# Patient Record
Sex: FEMALE | Race: WHITE | Employment: FULL TIME | URBAN - NONMETROPOLITAN AREA
[De-identification: names, ages, dates, MRNs, and addresses within clinical notes are randomized per-mention and may not be internally consistent; named-entity substitution may affect disease eponyms.]

---

## 2018-05-24 ENCOUNTER — NURSE TRIAGE (OUTPATIENT)
Dept: ADMINISTRATIVE | Age: 33
End: 2018-05-24

## 2018-05-25 ENCOUNTER — NURSE TRIAGE (OUTPATIENT)
Dept: ADMINISTRATIVE | Age: 33
End: 2018-05-25

## 2018-07-09 ENCOUNTER — HOSPITAL ENCOUNTER (INPATIENT)
Age: 33
LOS: 3 days | Discharge: HOME | End: 2018-07-12
Attending: OBSTETRICS & GYNECOLOGY | Admitting: OBSTETRICS & GYNECOLOGY
Payer: COMMERCIAL

## 2018-07-09 ENCOUNTER — APPOINTMENT (OUTPATIENT)
Dept: LABOR AND DELIVERY | Age: 33
End: 2018-07-09
Payer: COMMERCIAL

## 2018-07-09 LAB
ABO: NORMAL
AMPHETAMINE+METHAMPHETAMINE URINE SCREEN: NEGATIVE
ANTIBODY SCREEN: NORMAL
BARBITURATE QUANTITATIVE URINE: NEGATIVE
BASOPHILS # BLD: 0.3 %
BASOPHILS ABSOLUTE: 0 THOU/MM3 (ref 0–0.1)
BENZODIAZEPINE QUANTITATIVE URINE: NEGATIVE
CANNABINOID QUANTITATIVE URINE: NEGATIVE
COCAINE METABOLITE QUANTITATIVE URINE: NEGATIVE
EOSINOPHIL # BLD: 0.9 %
EOSINOPHILS ABSOLUTE: 0.1 THOU/MM3 (ref 0–0.4)
ERYTHROCYTE [DISTWIDTH] IN BLOOD BY AUTOMATED COUNT: 12.3 % (ref 11.5–14.5)
ERYTHROCYTE [DISTWIDTH] IN BLOOD BY AUTOMATED COUNT: 42.2 FL (ref 35–45)
HCT VFR BLD CALC: 36.3 % (ref 37–47)
HEMOGLOBIN: 12.4 GM/DL (ref 12–16)
IMMATURE GRANS (ABS): 0.1 THOU/MM3 (ref 0–0.07)
IMMATURE GRANULOCYTES: 0.8 %
LYMPHOCYTES # BLD: 13.2 %
LYMPHOCYTES ABSOLUTE: 1.6 THOU/MM3 (ref 1–4.8)
MCH RBC QN AUTO: 31.9 PG (ref 26–33)
MCHC RBC AUTO-ENTMCNC: 34.2 GM/DL (ref 32.2–35.5)
MCV RBC AUTO: 93.3 FL (ref 81–99)
MONOCYTES # BLD: 9.4 %
MONOCYTES ABSOLUTE: 1.1 THOU/MM3 (ref 0.4–1.3)
NUCLEATED RED BLOOD CELLS: 0 /100 WBC
OPIATES, URINE: NEGATIVE
OXYCODONE: NEGATIVE
PHENCYCLIDINE QUANTITATIVE URINE: NEGATIVE
PLATELET # BLD: 212 THOU/MM3 (ref 130–400)
PMV BLD AUTO: 10.3 FL (ref 9.4–12.4)
RBC # BLD: 3.89 MILL/MM3 (ref 4.2–5.4)
RH FACTOR: NORMAL
SEG NEUTROPHILS: 75.4 %
SEGMENTED NEUTROPHILS ABSOLUTE COUNT: 9 THOU/MM3 (ref 1.8–7.7)
WBC # BLD: 12 THOU/MM3 (ref 4.8–10.8)

## 2018-07-09 PROCEDURE — 6360000002 HC RX W HCPCS

## 2018-07-09 PROCEDURE — 86900 BLOOD TYPING SEROLOGIC ABO: CPT

## 2018-07-09 PROCEDURE — 86850 RBC ANTIBODY SCREEN: CPT

## 2018-07-09 PROCEDURE — 80307 DRUG TEST PRSMV CHEM ANLYZR: CPT

## 2018-07-09 PROCEDURE — 86592 SYPHILIS TEST NON-TREP QUAL: CPT

## 2018-07-09 PROCEDURE — 6370000000 HC RX 637 (ALT 250 FOR IP): Performed by: OBSTETRICS & GYNECOLOGY

## 2018-07-09 PROCEDURE — 2580000003 HC RX 258: Performed by: OBSTETRICS & GYNECOLOGY

## 2018-07-09 PROCEDURE — 86901 BLOOD TYPING SEROLOGIC RH(D): CPT

## 2018-07-09 PROCEDURE — 1220000001 HC SEMI PRIVATE L&D R&B

## 2018-07-09 PROCEDURE — 85025 COMPLETE CBC W/AUTO DIFF WBC: CPT

## 2018-07-09 PROCEDURE — 36415 COLL VENOUS BLD VENIPUNCTURE: CPT

## 2018-07-09 RX ORDER — METHYLERGONOVINE MALEATE 0.2 MG/ML
200 INJECTION INTRAVENOUS PRN
Status: DISCONTINUED | OUTPATIENT
Start: 2018-07-09 | End: 2018-07-10 | Stop reason: HOSPADM

## 2018-07-09 RX ORDER — DIPHENHYDRAMINE HYDROCHLORIDE 50 MG/ML
25 INJECTION INTRAMUSCULAR; INTRAVENOUS EVERY 4 HOURS PRN
Status: DISCONTINUED | OUTPATIENT
Start: 2018-07-09 | End: 2018-07-10 | Stop reason: HOSPADM

## 2018-07-09 RX ORDER — ZOLPIDEM TARTRATE 10 MG/1
10 TABLET ORAL NIGHTLY PRN
Status: DISCONTINUED | OUTPATIENT
Start: 2018-07-09 | End: 2018-07-10

## 2018-07-09 RX ORDER — ACETAMINOPHEN 325 MG/1
650 TABLET ORAL EVERY 4 HOURS PRN
Status: DISCONTINUED | OUTPATIENT
Start: 2018-07-09 | End: 2018-07-10 | Stop reason: HOSPADM

## 2018-07-09 RX ORDER — ONDANSETRON 2 MG/ML
8 INJECTION INTRAMUSCULAR; INTRAVENOUS EVERY 6 HOURS PRN
Status: DISCONTINUED | OUTPATIENT
Start: 2018-07-09 | End: 2018-07-10 | Stop reason: HOSPADM

## 2018-07-09 RX ORDER — TERBUTALINE SULFATE 1 MG/ML
0.25 INJECTION, SOLUTION SUBCUTANEOUS ONCE
Status: DISCONTINUED | OUTPATIENT
Start: 2018-07-09 | End: 2018-07-10 | Stop reason: HOSPADM

## 2018-07-09 RX ORDER — LIDOCAINE HYDROCHLORIDE 10 MG/ML
30 INJECTION, SOLUTION EPIDURAL; INFILTRATION; INTRACAUDAL; PERINEURAL PRN
Status: DISCONTINUED | OUTPATIENT
Start: 2018-07-09 | End: 2018-07-10 | Stop reason: HOSPADM

## 2018-07-09 RX ORDER — IBUPROFEN 800 MG/1
800 TABLET ORAL EVERY 8 HOURS PRN
Status: DISCONTINUED | OUTPATIENT
Start: 2018-07-09 | End: 2018-07-10

## 2018-07-09 RX ORDER — CARBOPROST TROMETHAMINE 250 UG/ML
250 INJECTION, SOLUTION INTRAMUSCULAR PRN
Status: CANCELLED | OUTPATIENT
Start: 2018-07-09

## 2018-07-09 RX ORDER — MORPHINE SULFATE 2 MG/ML
2 INJECTION, SOLUTION INTRAMUSCULAR; INTRAVENOUS
Status: DISCONTINUED | OUTPATIENT
Start: 2018-07-09 | End: 2018-07-10

## 2018-07-09 RX ORDER — SODIUM CHLORIDE, SODIUM LACTATE, POTASSIUM CHLORIDE, CALCIUM CHLORIDE 600; 310; 30; 20 MG/100ML; MG/100ML; MG/100ML; MG/100ML
INJECTION, SOLUTION INTRAVENOUS CONTINUOUS
Status: DISCONTINUED | OUTPATIENT
Start: 2018-07-09 | End: 2018-07-10

## 2018-07-09 RX ORDER — BUTORPHANOL TARTRATE 1 MG/ML
1 INJECTION, SOLUTION INTRAMUSCULAR; INTRAVENOUS
Status: DISCONTINUED | OUTPATIENT
Start: 2018-07-09 | End: 2018-07-10 | Stop reason: HOSPADM

## 2018-07-09 RX ORDER — MORPHINE SULFATE 4 MG/ML
4 INJECTION, SOLUTION INTRAMUSCULAR; INTRAVENOUS
Status: DISCONTINUED | OUTPATIENT
Start: 2018-07-09 | End: 2018-07-10

## 2018-07-09 RX ADMIN — SODIUM CHLORIDE, POTASSIUM CHLORIDE, SODIUM LACTATE AND CALCIUM CHLORIDE: 600; 310; 30; 20 INJECTION, SOLUTION INTRAVENOUS at 18:10

## 2018-07-09 RX ADMIN — Medication 25 MCG: at 20:05

## 2018-07-10 ENCOUNTER — ANESTHESIA EVENT (OUTPATIENT)
Dept: LABOR AND DELIVERY | Age: 33
End: 2018-07-10
Payer: COMMERCIAL

## 2018-07-10 ENCOUNTER — ANESTHESIA (OUTPATIENT)
Dept: LABOR AND DELIVERY | Age: 33
End: 2018-07-10
Payer: COMMERCIAL

## 2018-07-10 LAB — RPR: NONREACTIVE

## 2018-07-10 PROCEDURE — 3E0P7VZ INTRODUCTION OF HORMONE INTO FEMALE REPRODUCTIVE, VIA NATURAL OR ARTIFICIAL OPENING: ICD-10-PCS | Performed by: OBSTETRICS & GYNECOLOGY

## 2018-07-10 PROCEDURE — 6370000000 HC RX 637 (ALT 250 FOR IP): Performed by: OBSTETRICS & GYNECOLOGY

## 2018-07-10 PROCEDURE — 7200000001 HC VAGINAL DELIVERY

## 2018-07-10 PROCEDURE — 2500000003 HC RX 250 WO HCPCS: Performed by: ANESTHESIOLOGY

## 2018-07-10 PROCEDURE — 6360000002 HC RX W HCPCS: Performed by: OBSTETRICS & GYNECOLOGY

## 2018-07-10 PROCEDURE — 3700000025 ANESTHESIA EPIDURAL BLOCK: Performed by: ANESTHESIOLOGY

## 2018-07-10 PROCEDURE — 1220000000 HC SEMI PRIVATE OB R&B

## 2018-07-10 PROCEDURE — 0KQM0ZZ REPAIR PERINEUM MUSCLE, OPEN APPROACH: ICD-10-PCS | Performed by: OBSTETRICS & GYNECOLOGY

## 2018-07-10 PROCEDURE — 2580000003 HC RX 258: Performed by: OBSTETRICS & GYNECOLOGY

## 2018-07-10 RX ORDER — SODIUM CHLORIDE 0.9 % (FLUSH) 0.9 %
10 SYRINGE (ML) INJECTION EVERY 12 HOURS SCHEDULED
Status: DISCONTINUED | OUTPATIENT
Start: 2018-07-10 | End: 2018-07-11

## 2018-07-10 RX ORDER — LANOLIN 100 %
OINTMENT (GRAM) TOPICAL PRN
Status: DISCONTINUED | OUTPATIENT
Start: 2018-07-10 | End: 2018-07-12 | Stop reason: HOSPADM

## 2018-07-10 RX ORDER — ONDANSETRON 2 MG/ML
4 INJECTION INTRAMUSCULAR; INTRAVENOUS EVERY 6 HOURS PRN
Status: DISCONTINUED | OUTPATIENT
Start: 2018-07-10 | End: 2018-07-10 | Stop reason: HOSPADM

## 2018-07-10 RX ORDER — HYDROCODONE BITARTRATE AND ACETAMINOPHEN 5; 325 MG/1; MG/1
2 TABLET ORAL EVERY 4 HOURS PRN
Status: DISCONTINUED | OUTPATIENT
Start: 2018-07-10 | End: 2018-07-12 | Stop reason: HOSPADM

## 2018-07-10 RX ORDER — SODIUM CHLORIDE 0.9 % (FLUSH) 0.9 %
10 SYRINGE (ML) INJECTION PRN
Status: DISCONTINUED | OUTPATIENT
Start: 2018-07-10 | End: 2018-07-11

## 2018-07-10 RX ORDER — NALOXONE HYDROCHLORIDE 0.4 MG/ML
0.4 INJECTION, SOLUTION INTRAMUSCULAR; INTRAVENOUS; SUBCUTANEOUS PRN
Status: DISCONTINUED | OUTPATIENT
Start: 2018-07-10 | End: 2018-07-10 | Stop reason: HOSPADM

## 2018-07-10 RX ORDER — DOCUSATE SODIUM 100 MG/1
100 CAPSULE, LIQUID FILLED ORAL 2 TIMES DAILY
Status: DISCONTINUED | OUTPATIENT
Start: 2018-07-10 | End: 2018-07-12 | Stop reason: HOSPADM

## 2018-07-10 RX ORDER — ACETAMINOPHEN 325 MG/1
650 TABLET ORAL EVERY 4 HOURS PRN
Status: DISCONTINUED | OUTPATIENT
Start: 2018-07-10 | End: 2018-07-12 | Stop reason: HOSPADM

## 2018-07-10 RX ORDER — METHYLERGONOVINE MALEATE 0.2 MG/ML
200 INJECTION INTRAVENOUS
Status: ACTIVE | OUTPATIENT
Start: 2018-07-10 | End: 2018-07-10

## 2018-07-10 RX ORDER — IBUPROFEN 800 MG/1
800 TABLET ORAL EVERY 8 HOURS PRN
Status: DISCONTINUED | OUTPATIENT
Start: 2018-07-10 | End: 2018-07-12 | Stop reason: HOSPADM

## 2018-07-10 RX ORDER — NALBUPHINE HCL 10 MG/ML
5 AMPUL (ML) INJECTION EVERY 4 HOURS PRN
Status: DISCONTINUED | OUTPATIENT
Start: 2018-07-10 | End: 2018-07-10 | Stop reason: HOSPADM

## 2018-07-10 RX ORDER — SODIUM CHLORIDE, SODIUM LACTATE, POTASSIUM CHLORIDE, CALCIUM CHLORIDE 600; 310; 30; 20 MG/100ML; MG/100ML; MG/100ML; MG/100ML
INJECTION, SOLUTION INTRAVENOUS CONTINUOUS
Status: DISCONTINUED | OUTPATIENT
Start: 2018-07-10 | End: 2018-07-12 | Stop reason: HOSPADM

## 2018-07-10 RX ORDER — ONDANSETRON 4 MG/1
8 TABLET, FILM COATED ORAL EVERY 8 HOURS PRN
Status: DISCONTINUED | OUTPATIENT
Start: 2018-07-10 | End: 2018-07-12 | Stop reason: HOSPADM

## 2018-07-10 RX ORDER — HYDROCODONE BITARTRATE AND ACETAMINOPHEN 5; 325 MG/1; MG/1
1 TABLET ORAL EVERY 4 HOURS PRN
Status: DISCONTINUED | OUTPATIENT
Start: 2018-07-10 | End: 2018-07-12 | Stop reason: HOSPADM

## 2018-07-10 RX ADMIN — SODIUM CHLORIDE, POTASSIUM CHLORIDE, SODIUM LACTATE AND CALCIUM CHLORIDE: 600; 310; 30; 20 INJECTION, SOLUTION INTRAVENOUS at 17:04

## 2018-07-10 RX ADMIN — ONDANSETRON 8 MG: 2 INJECTION INTRAMUSCULAR; INTRAVENOUS at 15:28

## 2018-07-10 RX ADMIN — Medication 16 ML/HR: at 09:30

## 2018-07-10 RX ADMIN — SODIUM CHLORIDE, POTASSIUM CHLORIDE, SODIUM LACTATE AND CALCIUM CHLORIDE: 600; 310; 30; 20 INJECTION, SOLUTION INTRAVENOUS at 08:19

## 2018-07-10 RX ADMIN — SODIUM CHLORIDE, POTASSIUM CHLORIDE, SODIUM LACTATE AND CALCIUM CHLORIDE: 600; 310; 30; 20 INJECTION, SOLUTION INTRAVENOUS at 01:50

## 2018-07-10 RX ADMIN — Medication 25 MCG: at 00:04

## 2018-07-10 RX ADMIN — IBUPROFEN 800 MG: 800 TABLET ORAL at 17:35

## 2018-07-10 RX ADMIN — SODIUM CHLORIDE, POTASSIUM CHLORIDE, SODIUM LACTATE AND CALCIUM CHLORIDE: 600; 310; 30; 20 INJECTION, SOLUTION INTRAVENOUS at 11:46

## 2018-07-10 RX ADMIN — Medication 1 MILLI-UNITS/MIN: at 04:28

## 2018-07-10 ASSESSMENT — PAIN SCALES - GENERAL: PAINLEVEL_OUTOF10: 1

## 2018-07-10 NOTE — L&D DELIVERY NOTE
Respiratory Therapist (Night)      Cord    Vessels:  3 Vessels  Complications:  None  Delayed Cord Clamping?:  Yes  Cord Clamped Date/Time:  7/10/2018 1703  Cord Blood Disposition:  Lab  Gases Sent?:  No  Stem Cell Collection (by provider): No     Placenta    Date/time:  7/10/2018 1715  Removal:  Spontaneous  Appearance:  Intact  Disposition:  Refrigerator     Delivery Resuscitation    Method:  Bulb Suction, Stimulation     Apgars    Living status:  Living  Apgars   1 Minute:   5 Minute:   10 Minute 15 Minute 20 Minute   Skin Color: 0  1       Heart Rate: 2  2       Reflex Irritability: 1  2       Muscle Tone: 2  2       Respiratory Effort: 2  2       Total: 7  9               Apgars Assigned By:  JUDITH CASTILLO     Skin to Skin    Skin to skin initiation date/time: 7/10/18 1701   Skin to skin with:   Mother  Skin to skin end date/time:        Delivery Information    Episiotomy:  None  Perineal lacerations:  2nd Repaired:  Yes   Vaginal laceration:  No    Cervical laceration:  No    Surgical or additional est. blood loss (mL):  0 (View Only):  Edit in Flowsheets   Combined est. blood loss (mL):  0     Vaginal Delivery Counts    Initial count personnel:  16 INSTRUMENTS,1 NEEDLE,AND 20 SPONGES  Initial count verified by:  LEAH RIVER RN   4x4:   Needles:   Instruments:   Lap Pads:   Sponges:     Initial counts:          Final counts:             Other Procedures    Procedures:  None     Labor Length    1st stage:  6h 32m  2nd stage:  1h 26m  3rd stage:  0h 17m

## 2018-07-10 NOTE — ANESTHESIA PRE PROCEDURE
Department of Anesthesiology  Preprocedure Note       Name:  Zachariah Titus   Age:  28 y.o.  :  1985                                          MRN:  [id number]         Date:  7/10/2018      Surgeon: * No surgeons listed *    Procedure: * No procedures listed *    Medications prior to admission:   Prior to Admission medications    Not on File       Current medications:    Current Facility-Administered Medications   Medication Dose Route Frequency Provider Last Rate Last Dose    lactated ringers infusion   Intravenous Continuous Leigh Ann Blank,  mL/hr at 07/10/18 0819      lidocaine PF 1 % injection 30 mL  30 mL Other PRN Leigh Ann Blank, DO        butorphanol (STADOL) injection 1 mg  1 mg Intravenous Q1H PRN Leigh Ann Blank, DO        acetaminophen (TYLENOL) tablet 650 mg  650 mg Oral Q4H PRN Leigh Ann Blank, DO        ibuprofen (ADVIL;MOTRIN) tablet 800 mg  800 mg Oral Q8H PRN Leigh Ann Blank, DO        morphine (PF) injection 2 mg  2 mg Intravenous Q2H PRN Leigh Ann Blank, DO        Or    morphine (PF) injection 4 mg  4 mg Intravenous Q2H PRN Leigh Ann Blank, DO        oxytocin (PITOCIN) 30 units in 500 mL infusion  1 elizabeth-units/min Intravenous Continuous Leigh Ann Blank, DO 6 mL/hr at 07/10/18 0836 6 elizabeth-units/min at 07/10/18 0836    diphenhydrAMINE (BENADRYL) injection 25 mg  25 mg Intravenous Q4H PRN Leigh Ann Blank, DO        ondansetron [facility]) injection 8 mg  8 mg Intravenous Q6H PRN Leigh Ann Blank, DO        terbutaline (BRETHINE) injection 0.25 mg  0.25 mg Subcutaneous Once Leigh Ann Blank, DO        oxytocin (PITOCIN) 30 units in 500 mL infusion  1 elizabeth-units/min Intravenous Continuous PRN Leigh Ann Blank, DO        methylergonovine (METHERGINE) injection 200 mcg  200 mcg Intramuscular PRN Leigh Ann Blank, DO        witch hazel-glycerin (TUCKS) pad   Topical PRN Leigh Ann Blank, DO        benzocaine-menthol (DERMOPLAST) 20-0.5 % spray   Topical HYACINTH yDe DO        zolpidem [facility] [employer] [facility]) tablet 10 mg  10 mg Oral Nightly PRN Earl Dye DO           Allergies:  No Known Allergies    Problem List:  There is no problem list on file for this patient. Past Medical History:  History reviewed. No pertinent past medical history. Past Surgical History:  History reviewed. No pertinent surgical history. Social History:    Social History   Substance Use Topics    Smoking status: Never Smoker    Smokeless tobacco: Never Used    Alcohol use No                                Counseling given: Not Answered      Vital Signs (Current):   Vitals:    07/10/18 0710 07/10/18 0715 07/10/18 0745 07/10/18 0815   BP:  (!) 146/87 (!) 151/93 136/61   Pulse:  86 81 74   Resp:  16  16   Temp: 98.6 °F (37 °C)      TempSrc: Oral      SpO2:       Weight:       Height:                                                  BP Readings from Last 3 Encounters:   07/10/18 136/61       NPO Status: Time of last liquid consumption: 1645                        Time of last solid consumption: 1645                        Date of last liquid consumption: 07/09/18                        Date of last solid food consumption: 07/09/18    BMI:   Wt Readings from Last 3 Encounters:   07/09/18 237 lb (107.5 kg)     Body mass index is 38.25 kg/m². CBC:   Lab Results   Component Value Date    WBC 12.0 07/09/2018    RBC 3.89 07/09/2018    HGB 12.4 07/09/2018    HCT 36.3 07/09/2018    MCV 93.3 07/09/2018     07/09/2018       CMP: No results found for: NA, K, CL, CO2, BUN, CREATININE, GFRAA, AGRATIO, LABGLOM, GLUCOSE, PROT, CALCIUM, BILITOT, ALKPHOS, AST, ALT    POC Tests: No results for input(s): POCGLU, POCNA, POCK, POCCL, POCBUN, POCHEMO, POCHCT in the last 72 hours.     Coags: No results found for: PROTIME, INR, APTT    HCG (If Applicable): No results found for: PREGTESTUR, PREGSERUM, HCG, HCGQUANT     ABGs: No results found for: PHART, PO2ART, LNY0YGK, CJW2KPE, BEART,

## 2018-07-10 NOTE — PLAN OF CARE
Problem: Anxiety:  Goal: Level of anxiety will decrease  Level of anxiety will decrease   Outcome: Ongoing  anxiety levels will remain decreased throughout labor with deep breathing techniques and imagery as evidence by her remaining calm, cooperative and focused throughout labor. Problem: Breathing Pattern - Ineffective:  Goal: Able to breathe comfortably  Able to breathe comfortably   Outcome: Ongoing  will remain able to breathe comfortably throughout labor as evidence by no shortness of breath. Problem: Fluid Volume - Imbalance:  Goal: Absence of intrapartum hemorrhage signs and symptoms  Absence of intrapartum hemorrhage signs and symptoms   Outcome: Ongoing  will remain absent of intrapartum hemorrhage as evidence by an absence of signs and symptoms of abnormal bleeding. Problem: Infection - Intrapartum Infection:  Goal: Will show no infection signs and symptoms  Will show no infection signs and symptoms   Outcome: Ongoing  will remain without infection as evidence by showing no signs and symptoms of fever throughout labor. Problem: Labor Process - Prolonged:  Goal: Uterine contractions within specified parameters  Uterine contractions within specified parameters   Outcome: Ongoing  will continue to have adequate uterine contractions throughout labor as evidence by RN palpating the uterus muscle tightening and relaxing. Problem: Pain - Acute:  Goal: Able to cope with pain  Able to cope with pain   Outcome: Ongoing  will be able to cope appropriately with pain throughout labor as evidence by  remaining calm and cooperative.   Pain goal 4/10    Problem: Tissue Perfusion - Uteroplacental, Altered:  Goal: Absence of abnormal fetal heart rate pattern  Absence of abnormal fetal heart rate pattern   Outcome: Ongoing  will remain absent of abnormal fetal heart rate patterns by cooperating with RN's uteroplacental recessitation when necessary throughout labor as evidence by a reactive or

## 2018-07-10 NOTE — ANESTHESIA PROCEDURE NOTES
skin.  Aspiration for CSF/Blood: Negative    Paresthesia: Negative    Test dose: Negative (3ml 1. 5%Lidocaine with 1:200,000 Epinephrine)    Difficulties/Complications: None    Epidural Medication:  Bolus Dose:   Premixed Syringe: Ropivacaine 0.2% 0ml given. Injection was made incrementally with constant monitoring and aspiration. Infusion Dose:  Premixed Bag: Ropivacaine 0.1% with Fentanyl 3 mcg/ml started at 16 ml/hr. Catheter bolused with 15 mL from premixed bag.     Willam Ramirez MD (ATTENDING ANESTHESIOLOGIST: Imm)    10:05 AM

## 2018-07-10 NOTE — FLOWSHEET NOTE
Dr. Natasha Casanova returned page. Updated on patient status. EFM reactive, aubrey every 2-6 minutes irregularly, VE 1-2/70/-2. Updated that cytotec is due.  MD stated to not give cytotec, to start pitocin and MD will be in to break water this AM.

## 2018-07-10 NOTE — PLAN OF CARE
Problem: Anxiety:  Goal: Level of anxiety will decrease  Level of anxiety will decrease   Outcome: Ongoing  Pt remains calm about the birthing experience, pt's mother at bedside, supportive. All questions/concerns addressed by RN. Problem: Breathing Pattern - Ineffective:  Goal: Able to breathe comfortably  Able to breathe comfortably   Outcome: Ongoing  No signs of resp distress noted. Sp02 remains greater than 92% on room air. Respirations equal and unlabored. Problem: Fluid Volume - Imbalance:  Goal: Absence of intrapartum hemorrhage signs and symptoms  Absence of intrapartum hemorrhage signs and symptoms   Outcome: Ongoing  No vaginal bleeding noted, will continue to monitor. Problem: Infection - Intrapartum Infection:  Goal: Will show no infection signs and symptoms  Will show no infection signs and symptoms   Outcome: Ongoing  Vitals stable, pt remains afebrile. GBS negative. FHT's remain reassuring, will continue to monitor. Problem: Labor Process - Prolonged:  Goal: Uterine contractions within specified parameters  Uterine contractions within specified parameters   Outcome: Ongoing  Pt having regular contractions. Pitocin infusing per order. Will continue to monitor. Problem: Pain - Acute:  Goal: Able to cope with pain  Able to cope with pain   Outcome: Ongoing  Pt rating her contractions 2/10 at this time. Denies need for epidural or pain meds currently. Will continue to assess. Problem: Tissue Perfusion - Uteroplacental, Altered:  Goal: Absence of abnormal fetal heart rate pattern  Absence of abnormal fetal heart rate pattern   Outcome: Ongoing  Fetal Heart Tones remain reassuring. Continuous EFM in place. Problem: Urinary Retention:  Goal: Urinary elimination within specified parameters  Urinary elimination within specified parameters   Outcome: Ongoing  Pt ambulating to bathroom as needed.     Problem: Falls - Risk of:  Goal: Will remain free from falls  Will remain free from falls   Outcome: Ongoing  Pt. Remains free from falls at this time. IV infusing per order. RN encouraged pt. To call for assistance to BR. Side rails up X2. Call light within reach. Pt's mother at bedside. RN will continue to provide for a safe environment. Problem: Discharge Planning:  Goal: Discharged to appropriate level of care  Discharged to appropriate level of care   Outcome: Ongoing  Pt aware of 2hr recovery period in L&D and then transfer to mom/baby for the remainder of her stay. Comments: Care plan reviewed with patient and her other, Petty Bertrand. Patient and Petty Bertrand verbalize understanding of the plan of care and contribute to goal setting.

## 2018-07-10 NOTE — PLAN OF CARE
Problem: Anxiety:  Goal: Level of anxiety will decrease  Level of anxiety will decrease   Outcome: Ongoing  Patient resting easy with family at bedside for support. Problem: Breathing Pattern - Ineffective:  Goal: Able to breathe comfortably  Able to breathe comfortably   Outcome: Ongoing  Patient reports no pain at this time. Problem: Fluid Volume - Imbalance:  Goal: Absence of intrapartum hemorrhage signs and symptoms  Absence of intrapartum hemorrhage signs and symptoms   Outcome: Ongoing  No s.s of hemorrhage at this time, will continue to monitor. Problem: Infection - Intrapartum Infection:  Goal: Will show no infection signs and symptoms  Will show no infection signs and symptoms   Outcome: Ongoing  Afebrile, will continue to monitor. Problem: Labor Process - Prolonged:  Goal: Uterine contractions within specified parameters  Uterine contractions within specified parameters   Outcome: Ongoing  Cytotec induction, TOCO in place. Ctx difficult to trace RN continuing to adjust.    Problem: Pain - Acute:  Goal: Able to cope with pain  Able to cope with pain   Outcome: Ongoing  Stated having no pain at this time, will continue to monitor. Problem: Tissue Perfusion - Uteroplacental, Altered:  Goal: Absence of abnormal fetal heart rate pattern  Absence of abnormal fetal heart rate pattern   Outcome: Ongoing  EFM in place, FHT reactive. Problem: Urinary Retention:  Goal: Urinary elimination within specified parameters  Urinary elimination within specified parameters   Outcome: Ongoing  Up to void appropriate amounts. Problem: Falls - Risk of:  Goal: Will remain free from falls  Will remain free from falls   Outcome: Ongoing  In bed, instructed to call RN for assistance. Call light and bedside table in reach. No falls this shift.     Problem: Discharge Planning:  Goal: Discharged to appropriate level of care  Discharged to appropriate level of care   Outcome: Ongoing  Will stay on L&D for recovery and will then be transferred to mom&baby for postpartum. Comments: Care plan reviewed with patient. Patient verbalizes understanding of the plan of care and contributes to goal setting.

## 2018-07-10 NOTE — FLOWSHEET NOTE
Dr. Darrius Maki called unit, updated that Cytotec was placed at 2005, cervix was closed and posterior. Also made aware of FHT, ctx pattern, blood pressures. Orders received.

## 2018-07-10 NOTE — FLOWSHEET NOTE
Dr Alvin Clarke phoned unit. Updated on pt's status and VE. She's reviewing EFM strip from office. Pt was on her right side and now left, IV fluid bolus infusing, internal monitors placed without difficulty for closer monitoring, and pitocin off.

## 2018-07-10 NOTE — FLOWSHEET NOTE
Dr lAvin Clarke updated on pt's status. VE: 5/90/-2, anterior. FHT's showing late decelerations despite position changes, iv fluid bolus, and with the pitocin off. Will place in hands/knees and update.

## 2018-07-10 NOTE — H&P
Obstetric Admission Note        CHIEF COMPLAINT: for delivery    HISTORY OF PRESENT ILLNESS:                     The patient is a 28 y.o.  female @ 37+2 weeks with significant past medical history of gest htn who presents for induction. 2 cytotecs placed last pm.  Good FM. No SROM. ctxs q2-3min. Past Medical History:    History reviewed. No pertinent past medical history. Medications Prior to Admission:   No prescriptions prior to admission. Allergies:  Patient has no known allergies. DATA:    Cervical exam 3/70/-2  Membranes ruptured at 0740  FHT's 150  [place] q2-3min    ASSESSMENT AND PLAN:      Assessment problems  IUP @ 37+2wk  Gest HTN    Plan:  - Admit to L&D  - Anticipate Vaginal Delivery  Russell SANTOS

## 2018-07-11 PROCEDURE — 6370000000 HC RX 637 (ALT 250 FOR IP): Performed by: OBSTETRICS & GYNECOLOGY

## 2018-07-11 PROCEDURE — 1220000000 HC SEMI PRIVATE OB R&B

## 2018-07-11 RX ADMIN — ACETAMINOPHEN 650 MG: 325 TABLET ORAL at 19:42

## 2018-07-11 RX ADMIN — ACETAMINOPHEN 650 MG: 325 TABLET ORAL at 14:57

## 2018-07-11 RX ADMIN — IBUPROFEN 800 MG: 800 TABLET ORAL at 17:49

## 2018-07-11 RX ADMIN — IBUPROFEN 800 MG: 800 TABLET ORAL at 01:41

## 2018-07-11 RX ADMIN — DOCUSATE SODIUM 100 MG: 100 CAPSULE, LIQUID FILLED ORAL at 10:22

## 2018-07-11 RX ADMIN — IBUPROFEN 800 MG: 800 TABLET ORAL at 09:48

## 2018-07-11 RX ADMIN — DOCUSATE SODIUM 100 MG: 100 CAPSULE, LIQUID FILLED ORAL at 21:57

## 2018-07-11 ASSESSMENT — PAIN SCALES - GENERAL
PAINLEVEL_OUTOF10: 1
PAINLEVEL_OUTOF10: 2
PAINLEVEL_OUTOF10: 4

## 2018-07-11 ASSESSMENT — ACTIVITIES OF DAILY LIVING (ADL): EFFECT OF PAIN ON DAILY ACTIVITIES: 3

## 2018-07-12 VITALS
RESPIRATION RATE: 18 BRPM | WEIGHT: 237 LBS | TEMPERATURE: 98.3 F | BODY MASS INDEX: 38.09 KG/M2 | HEART RATE: 91 BPM | OXYGEN SATURATION: 98 % | HEIGHT: 66 IN | SYSTOLIC BLOOD PRESSURE: 139 MMHG | DIASTOLIC BLOOD PRESSURE: 79 MMHG

## 2018-07-12 PROCEDURE — 6370000000 HC RX 637 (ALT 250 FOR IP): Performed by: OBSTETRICS & GYNECOLOGY

## 2018-07-12 RX ADMIN — IBUPROFEN 800 MG: 800 TABLET ORAL at 19:14

## 2018-07-12 RX ADMIN — IBUPROFEN 800 MG: 800 TABLET ORAL at 02:44

## 2018-07-12 RX ADMIN — DOCUSATE SODIUM 100 MG: 100 CAPSULE, LIQUID FILLED ORAL at 10:50

## 2018-07-12 RX ADMIN — ACETAMINOPHEN 650 MG: 325 TABLET ORAL at 00:08

## 2018-07-12 RX ADMIN — IBUPROFEN 800 MG: 800 TABLET ORAL at 10:49

## 2018-07-12 RX ADMIN — ACETAMINOPHEN 650 MG: 325 TABLET ORAL at 15:44

## 2018-07-12 RX ADMIN — DOCUSATE SODIUM 100 MG: 100 CAPSULE, LIQUID FILLED ORAL at 19:15

## 2018-07-12 ASSESSMENT — PAIN SCALES - GENERAL
PAINLEVEL_OUTOF10: 1
PAINLEVEL_OUTOF10: 1
PAINLEVEL_OUTOF10: 2
PAINLEVEL_OUTOF10: 2
PAINLEVEL_OUTOF10: 3

## 2018-07-12 NOTE — PROGRESS NOTES
Department of Obstetrics and Gynecology  Labor and Delivery  Post Partum Day #1      SUBJECTIVE:  Patient feeling well with no complaints. Breastfeeding without difficulty. Mild lochia. Patient denies pain. Urination without difficulty. OBJECTIVE:BP (!) 141/83   Pulse 84   Temp 97.9 °F (36.6 °C) (Oral)   Resp 16   Ht 5' 6\" (1.676 m)   Wt 237 lb (107.5 kg)   SpO2 98%   Breastfeeding? Unknown   BMI 38.25 kg/m²    ABDOMEN:  Soft, non-tender, fundus firm. Extremities: warm and dry. tr edema    DATA:    Lab Results   Component Value Date    HGB 12.4 2018    HCT 36.3 2018       ASSESSMENT & PLAN:    PPD #1 s/p .         Routine PP care       D/C home tomorrow       F/U 4 weeks       Horacio Burden

## 2018-07-12 NOTE — FLOWSHEET NOTE
Postpartum education brochure given, teaching complete. Marcy postpartum depression screening discussed with patient, instructed to contact her healthcare provider if her score is > 10. Patient voiced understanding. Mother's blood type is O+ . Rhogam not indicated. Patient Wil [place] screening less than 10 at discharge.

## 2018-07-12 NOTE — DISCHARGE SUMMARY
Obstetric Discharge Summary      Pt Name: Polly Singh  MRN: [id number] Liat #: [de-identified]  YOB: 1985        Admitting Diagnosis  IUP  OB History      Para Term  AB Living    1 1 1     1    SAB TAB Ectopic Molar Multiple Live Births            0 1          Reasons for Admission on 2018  5:01 PM  Spontaneous vaginal delivery [O80]  No comment available  Vaginal Delivery      Intrapartum Procedures               , 2nd degree laceration           Postpartum/Operative Complications        Data  Information for the patient's :  Francesco Flash Girl [place] [[id number]]   female  Birth Weight: 6 lb 11.1 oz (3.035 kg)      Discharge Diagnosis       Discharge Information  There are no discharge medications for this patient. No discharge procedures on file. Vaginal Delivery  Diet regular    Discharge to:  Home 18  Follow up in 4 weeks  Phan Gilbert.

## 2018-07-12 NOTE — PLAN OF CARE
Problem: Pain - Acute:  Goal: Pain level will decrease  Pain level will decrease    Outcome: Ongoing  Pt taking pain medication with relief    Problem: Discharge Planning:  Goal: Discharged to appropriate level of care  Discharged to appropriate level of care   Outcome: Ongoing  No discharge for this shift    Problem: Constipation:  Goal: Bowel elimination is within specified parameters  Bowel elimination is within specified parameters   Outcome: Ongoing  Pt passing gas and taking stool softners    Problem: Infection - Risk of, Puerperal Infection:  Goal: Will show no infection signs and symptoms  Will show no infection signs and symptoms   Outcome: Ongoing  No signs of infection at this time    Problem: Mood - Altered:  Goal: Mood stable  Mood stable   Outcome: Ongoing  Pt appropriate with infant family and this RN    Problem: Pain:  Goal: Pain level will decrease  Pain level will decrease    Outcome: Ongoing  Pt taking pain medication with relief    Comments: Care plan reviewed with patient and she contributes to goal setting and voices understanding of plan of care.

## 2018-10-07 ENCOUNTER — HOSPITAL ENCOUNTER (EMERGENCY)
Age: 33
Discharge: HOME | End: 2018-10-07
Payer: COMMERCIAL

## 2018-10-07 VITALS
BODY MASS INDEX: 34.55 KG/M2 | SYSTOLIC BLOOD PRESSURE: 130 MMHG | DIASTOLIC BLOOD PRESSURE: 83 MMHG | OXYGEN SATURATION: 100 % | HEART RATE: 88 BPM | RESPIRATION RATE: 16 BRPM | HEIGHT: 66 IN | WEIGHT: 215 LBS | TEMPERATURE: 97 F

## 2018-10-07 DIAGNOSIS — H66.92 LEFT OTITIS MEDIA WITH SPONTANEOUS RUPTURE OF EARDRUM: Primary | ICD-10-CM

## 2018-10-07 DIAGNOSIS — J06.9 VIRAL URI WITH COUGH: ICD-10-CM

## 2018-10-07 DIAGNOSIS — H72.92 LEFT OTITIS MEDIA WITH SPONTANEOUS RUPTURE OF EARDRUM: Primary | ICD-10-CM

## 2018-10-07 PROCEDURE — 99202 OFFICE O/P NEW SF 15 MIN: CPT | Performed by: NURSE PRACTITIONER

## 2018-10-07 PROCEDURE — 99212 OFFICE O/P EST SF 10 MIN: CPT

## 2018-10-07 RX ORDER — AMOXICILLIN AND CLAVULANATE POTASSIUM 875; 125 MG/1; MG/1
1 TABLET, FILM COATED ORAL 2 TIMES DAILY
Qty: 20 TABLET | Refills: 0 | Status: SHIPPED | OUTPATIENT
Start: 2018-10-07 | End: 2018-10-17

## 2018-10-07 RX ORDER — LISINOPRIL AND HYDROCHLOROTHIAZIDE 12.5; 1 MG/1; MG/1
1 TABLET ORAL DAILY
COMMUNITY

## 2018-10-07 RX ORDER — LEVONORGESTREL / ETHINYL ESTRADIOL AND ETHINYL ESTRADIOL 150-30(84)
KIT ORAL
COMMUNITY

## 2018-10-07 RX ORDER — SERTRALINE HYDROCHLORIDE 100 MG/1
100 TABLET, FILM COATED ORAL DAILY
COMMUNITY

## 2018-10-07 ASSESSMENT — PAIN DESCRIPTION - LOCATION: LOCATION: EAR

## 2018-10-07 ASSESSMENT — ENCOUNTER SYMPTOMS
SORE THROAT: 0
SINUS PRESSURE: 0
BACK PAIN: 0
ABDOMINAL PAIN: 0
RHINORRHEA: 0
DIARRHEA: 0
CHEST TIGHTNESS: 0
COUGH: 0
NAUSEA: 0
SINUS PAIN: 0
VOMITING: 0

## 2018-10-07 ASSESSMENT — PAIN SCALES - GENERAL: PAINLEVEL_OUTOF10: 3

## 2018-10-07 ASSESSMENT — PAIN DESCRIPTION - DESCRIPTORS: DESCRIPTORS: ACHING

## 2018-10-07 ASSESSMENT — PAIN DESCRIPTION - ORIENTATION: ORIENTATION: LEFT

## 2018-10-07 NOTE — ED TRIAGE NOTES
Patient has had sinus congestion for last week, woke up in the night with left ear pain and her ear draining.

## 2018-12-05 ENCOUNTER — OFFICE VISIT (OUTPATIENT)
Dept: DERMATOLOGY | Age: 33
End: 2018-12-05
Payer: COMMERCIAL

## 2018-12-05 VITALS — WEIGHT: 22.6 LBS | BODY MASS INDEX: 3.65 KG/M2

## 2018-12-05 DIAGNOSIS — L81.3 CAFE AU LAIT SPOTS: ICD-10-CM

## 2018-12-05 DIAGNOSIS — Q82.5 PORT-WINE STAIN OF FACE: Primary | ICD-10-CM

## 2018-12-05 DIAGNOSIS — D22.5 MELANOCYTIC NEVI OF TRUNK: ICD-10-CM

## 2018-12-05 DIAGNOSIS — D22.9 NEVUS SPILUS: ICD-10-CM

## 2018-12-05 PROCEDURE — 99203 OFFICE O/P NEW LOW 30 MIN: CPT | Performed by: DERMATOLOGY

## 2018-12-05 NOTE — PROGRESS NOTES
12/5/2018    Subjective   CC:  Chief Complaint   Patient presents with    Other     raised areas on chin    Other     FBSE     HPI:  Pt is a 28 y.o. female with raised red area on rt. Chin since April, 2018 (during pregnancy) - itching, +bleeding when picked  OTC Acne medication (Neutrogena)  Large red area on chin, hx of 10 years, getting darker. Dr. Marques Just sent to [facility] and did a Laser treatment without success. Hx of acne, recurrent since teen years  FBSE      Sunscreen: yes - SPF (unknown)  Quiroga: yes   Tanning bed use: no  Melanoma family or personal hx: no  NMSC family or personal hx: no  H/o eczema: no  H/o psoriasis: no         Past Medical Hx: No past medical history on file. Past Social Hx:  Social History   Substance Use Topics    Smoking status: Never Smoker    Smokeless tobacco: Never Used    Alcohol use Yes      Comment: socially     Past Family Hx: No family history on file. ROS: no seasonal allergies, no fever, no easy bruisability and/or prolonged bleeding after procedures, no joint pain, no itching, no new lesions       Physical Examination:  Gen: alert,  Nad, normal mood/affect  Examination was performed of the following:   psych/neuro, scalp/hair, head/face, conjunctivae/eyelids, gums/teeth/lips, neck, breast/axilla/chest, abdomen, back, RUE, LUE, RLE, LLE, nails/digits, oral mucosa/tongue, genitalia/groin/buttocks, peripheral vascular and lymphatic     Vascular ectatic patch on mid chin   Friable vascular papule on Rt lower chin   Smooth dome shaped papule left post auricular scalp  Dome shaped nodule Left scalp  Left palm with well circumscribed broad tan macule w speckled dark macules centrally   2-6 tan brown macules on trunk/BUE/BLE    Assessment/Plan  1-Port Wine Stain lower chin  Discussed tx with Vascular laser. Not avail at curently clinic   Will refer to Dr. 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  Pyogenic

## 2018-12-19 ENCOUNTER — OFFICE VISIT (OUTPATIENT)
Dept: DERMATOLOGY | Age: 33
End: 2018-12-19
Payer: COMMERCIAL

## 2018-12-19 VITALS — WEIGHT: 224.6 LBS | BODY MASS INDEX: 36.25 KG/M2

## 2018-12-19 DIAGNOSIS — L98.0 PYOGENIC GRANULOMA: Primary | ICD-10-CM

## 2018-12-19 PROCEDURE — 17110 DESTRUCTION B9 LES UP TO 14: CPT | Performed by: DERMATOLOGY

## 2019-01-02 ENCOUNTER — TELEPHONE (OUTPATIENT)
Dept: DERMATOLOGY | Age: 34
End: 2019-01-02

## 2021-09-13 NOTE — ANESTHESIA POSTPROCEDURE EVALUATION
Department of Anesthesiology  Postprocedure Note    Patient: Ephraim Negron  MRN: [id number]  YOB: 1985  Date of evaluation: 7/11/2018  Time:  8:09 AM     Procedure Summary     Date:  07/10/18 Room / Location:      Anesthesia Start:  0915 Anesthesia Stop:  9836    Procedure:  ANESTHESIA LABOR ANALGESIA Diagnosis:      Scheduled Providers:   Responsible Provider:  Uyen Valdez MD    Anesthesia Type:  epidural ASA Status:  2          Anesthesia Type: epidural    Marisa Phase I: Marisa Score: 9    Marisa Phase II: Marisa Score: 10    Last vitals: Reviewed and per EMR flowsheets.        Anesthesia Post Evaluation    Patient location during evaluation: bedside  Patient participation: complete - patient participated  Level of consciousness: awake and alert  Pain score: 0  Airway patency: patent  Nausea & Vomiting: no nausea and no vomiting  Complications: no  Cardiovascular status: blood pressure returned to baseline and hemodynamically stable  Respiratory status: acceptable  Hydration status: euvolemic Patient lost her sense of taste so she did a home covid test, which came back positive  She has an apt with you this Wednesday, do you want to put in a PCR test and change her apt to virtual?  Please advise    She is still having symptoms

## 2023-08-03 ENCOUNTER — APPOINTMENT (RX ONLY)
Dept: URBAN - METROPOLITAN AREA CLINIC 322 | Facility: CLINIC | Age: 38
Setting detail: DERMATOLOGY
End: 2023-08-03

## 2023-08-03 DIAGNOSIS — Q82.5 CONGENITAL NON-NEOPLASTIC NEVUS: ICD-10-CM

## 2023-08-03 PROCEDURE — ? COUNSELING

## 2023-08-03 PROCEDURE — ? PULSED-DYE LASER TREATMENT

## 2023-08-03 PROCEDURE — 96999 UNLISTED SPEC DERM SVC/PX: CPT

## 2023-08-03 ASSESSMENT — LOCATION DETAILED DESCRIPTION DERM: LOCATION DETAILED: LEFT CHIN

## 2023-08-03 ASSESSMENT — LOCATION ZONE DERM: LOCATION ZONE: FACE

## 2023-08-03 ASSESSMENT — LOCATION SIMPLE DESCRIPTION DERM: LOCATION SIMPLE: CHIN

## 2023-08-03 NOTE — PROCEDURE: PULSED-DYE LASER TREATMENT
Billing: [id number] (Unlisted special dermatological service or procedure) Billing: [id number] (Unlisted special dermatological service or procedure)

## 2023-09-07 ENCOUNTER — APPOINTMENT (RX ONLY)
Dept: URBAN - METROPOLITAN AREA CLINIC 322 | Facility: CLINIC | Age: 38
Setting detail: DERMATOLOGY
End: 2023-09-07

## 2023-09-07 DIAGNOSIS — Q82.5 CONGENITAL NON-NEOPLASTIC NEVUS: ICD-10-CM | Status: RESOLVING

## 2023-09-07 PROCEDURE — 96999 UNLISTED SPEC DERM SVC/PX: CPT

## 2023-09-07 PROCEDURE — ? PULSED-DYE LASER TREATMENT

## 2023-09-07 PROCEDURE — ? COUNSELING

## 2023-09-07 ASSESSMENT — LOCATION DETAILED DESCRIPTION DERM: LOCATION DETAILED: LEFT CHIN

## 2023-09-07 ASSESSMENT — LOCATION SIMPLE DESCRIPTION DERM: LOCATION SIMPLE: CHIN

## 2023-09-07 ASSESSMENT — LOCATION ZONE DERM: LOCATION ZONE: FACE

## 2023-09-07 NOTE — PROCEDURE: PULSED-DYE LASER TREATMENT
Billing: [id number] (Unlisted special dermatological service or procedure) Billing: [id number] (Unlisted special dermatological service or procedure)

## 2023-10-16 ENCOUNTER — APPOINTMENT (RX ONLY)
Dept: URBAN - METROPOLITAN AREA CLINIC 322 | Facility: CLINIC | Age: 38
Setting detail: DERMATOLOGY
End: 2023-10-16

## 2023-10-16 DIAGNOSIS — Q82.5 CONGENITAL NON-NEOPLASTIC NEVUS: ICD-10-CM

## 2023-10-16 PROCEDURE — ? COUNSELING

## 2023-10-16 PROCEDURE — ? PULSED-DYE LASER TREATMENT

## 2023-10-16 PROCEDURE — 96999 UNLISTED SPEC DERM SVC/PX: CPT

## 2023-10-16 PROCEDURE — ? FULL BODY SKIN EXAM - DECLINED

## 2023-10-16 ASSESSMENT — LOCATION DETAILED DESCRIPTION DERM: LOCATION DETAILED: LEFT CHIN

## 2023-10-16 ASSESSMENT — LOCATION SIMPLE DESCRIPTION DERM: LOCATION SIMPLE: CHIN

## 2023-10-16 ASSESSMENT — LOCATION ZONE DERM: LOCATION ZONE: FACE

## 2023-10-16 NOTE — PROCEDURE: PULSED-DYE LASER TREATMENT
Billing: [id number] (Unlisted special dermatological service or procedure) Billing: [id number] (Unlisted special dermatological service or procedure)

## 2023-11-27 ENCOUNTER — APPOINTMENT (RX ONLY)
Dept: URBAN - METROPOLITAN AREA CLINIC 322 | Facility: CLINIC | Age: 38
Setting detail: DERMATOLOGY
End: 2023-11-27

## 2023-11-27 DIAGNOSIS — Q82.5 CONGENITAL NON-NEOPLASTIC NEVUS: ICD-10-CM

## 2023-11-27 PROCEDURE — ? PULSED-DYE LASER TREATMENT

## 2023-11-27 PROCEDURE — ? FULL BODY SKIN EXAM - DECLINED

## 2023-11-27 PROCEDURE — 96999 UNLISTED SPEC DERM SVC/PX: CPT

## 2023-11-27 PROCEDURE — ? COUNSELING

## 2023-11-27 ASSESSMENT — LOCATION SIMPLE DESCRIPTION DERM: LOCATION SIMPLE: CHIN

## 2023-11-27 ASSESSMENT — LOCATION ZONE DERM: LOCATION ZONE: FACE

## 2023-11-27 ASSESSMENT — LOCATION DETAILED DESCRIPTION DERM: LOCATION DETAILED: LEFT CHIN

## 2023-11-27 NOTE — PROCEDURE: PULSED-DYE LASER TREATMENT
Post-Procedure: Following the procedure vaseline and ice was applied to the treatment
Billing: [id number] (Unlisted special dermatological service or procedure)
Total Square Area In Cm2 (Required For Proper Billing- Whole Numbers Only Please): 12
Immediate Endpoint (Optional): purpura
Delay Time (Ms): 20
Post-Care Instructions: I reviewed with the patient in detail post-care instructions. Patient should stay away from the sun and wear sun protection until treated areas are fully healed.
Cryogen Time (Ms): 30
Pre-Procedure: Prior to the procedure all persons present put on protective eye-wear.
Detail Level: Simple
Laser Type: Vbeam 595nm
Spot Size: 7 mm
Consent: Written consent obtained, risks reviewed including but not limited to crusting, scabbing, blistering, scarring, darker or lighter pigmentary change, incidental hair removal, bruising, and/or incomplete improvement.
Pulse Duration: 1.5 ms

## 2024-01-08 ENCOUNTER — APPOINTMENT (RX ONLY)
Dept: URBAN - METROPOLITAN AREA CLINIC 322 | Facility: CLINIC | Age: 39
Setting detail: DERMATOLOGY
End: 2024-01-08

## 2024-01-08 DIAGNOSIS — Q82.5 CONGENITAL NON-NEOPLASTIC NEVUS: ICD-10-CM

## 2024-01-08 PROCEDURE — ? FULL BODY SKIN EXAM - DECLINED

## 2024-01-08 PROCEDURE — ? COUNSELING

## 2024-01-08 PROCEDURE — 96999 UNLISTED SPEC DERM SVC/PX: CPT

## 2024-01-08 PROCEDURE — ? PULSED-DYE LASER TREATMENT

## 2024-01-08 ASSESSMENT — LOCATION ZONE DERM: LOCATION ZONE: FACE

## 2024-01-08 ASSESSMENT — LOCATION DETAILED DESCRIPTION DERM: LOCATION DETAILED: LEFT CHIN

## 2024-01-08 ASSESSMENT — LOCATION SIMPLE DESCRIPTION DERM: LOCATION SIMPLE: CHIN

## 2024-01-08 NOTE — PROCEDURE: PULSED-DYE LASER TREATMENT
Post-Procedure: Following the procedure vaseline and ice was applied to the treatment
Billing: [id number] (Unlisted special dermatological service or procedure)
Total Square Area In Cm2 (Required For Proper Billing- Whole Numbers Only Please): 12
Immediate Endpoint (Optional): purpura
Delay Time (Ms): 10
Post-Care Instructions: I reviewed with the patient in detail post-care instructions. Patient should stay away from the sun and wear sun protection until treated areas are fully healed.
Cryogen Time (Ms): 20
Pre-Procedure: Prior to the procedure all persons present put on protective eye-wear.
Detail Level: Simple
Laser Type: Vbeam 595nm
Spot Size: 7 mm
Consent: Written consent obtained, risks reviewed including but not limited to crusting, scabbing, blistering, scarring, darker or lighter pigmentary change, incidental hair removal, bruising, and/or incomplete improvement.
Pulse Duration: 1.5 ms

## 2024-02-19 ENCOUNTER — APPOINTMENT (RX ONLY)
Dept: URBAN - METROPOLITAN AREA CLINIC 322 | Facility: CLINIC | Age: 39
Setting detail: DERMATOLOGY
End: 2024-02-19

## 2024-02-19 DIAGNOSIS — Q82.5 CONGENITAL NON-NEOPLASTIC NEVUS: ICD-10-CM

## 2024-02-19 PROCEDURE — ? PULSED-DYE LASER TREATMENT

## 2024-02-19 PROCEDURE — 96999 UNLISTED SPEC DERM SVC/PX: CPT

## 2024-02-19 PROCEDURE — ? COUNSELING

## 2024-02-19 PROCEDURE — ? FULL BODY SKIN EXAM - DECLINED

## 2024-02-19 ASSESSMENT — LOCATION ZONE DERM: LOCATION ZONE: FACE

## 2024-02-19 ASSESSMENT — LOCATION SIMPLE DESCRIPTION DERM: LOCATION SIMPLE: CHIN

## 2024-02-19 ASSESSMENT — LOCATION DETAILED DESCRIPTION DERM: LOCATION DETAILED: LEFT CHIN

## 2024-02-19 NOTE — PROCEDURE: PULSED-DYE LASER TREATMENT
Post-Procedure: Following the procedure vaseline and ice was applied to the treatment
Billing: [id number] (Unlisted special dermatological service or procedure)
Total Square Area In Cm2 (Required For Proper Billing- Whole Numbers Only Please): 12
Immediate Endpoint (Optional): purpura
Delay Time (Ms): 10
Post-Care Instructions: I reviewed with the patient in detail post-care instructions. Patient should stay away from the sun and wear sun protection until treated areas are fully healed.
Cryogen Time (Ms): 20
Pre-Procedure: Prior to the procedure all persons present put on protective eye-wear.
Detail Level: Simple
Laser Type: Vbeam 595nm
Spot Size: 7 mm
Consent: Written consent obtained, risks reviewed including but not limited to crusting, scabbing, blistering, scarring, darker or lighter pigmentary change, incidental hair removal, bruising, and/or incomplete improvement.
Pulse Duration: 1.5 ms

## 2025-08-11 ENCOUNTER — APPOINTMENT (OUTPATIENT)
Dept: URBAN - METROPOLITAN AREA CLINIC 322 | Facility: CLINIC | Age: 40
Setting detail: DERMATOLOGY
End: 2025-08-11

## 2025-08-11 DIAGNOSIS — Q82.5 CONGENITAL NON-NEOPLASTIC NEVUS: ICD-10-CM

## 2025-08-11 PROCEDURE — ? PULSED-DYE LASER TREATMENT

## 2025-08-11 ASSESSMENT — LOCATION SIMPLE DESCRIPTION DERM: LOCATION SIMPLE: CHIN

## 2025-08-11 ASSESSMENT — LOCATION ZONE DERM: LOCATION ZONE: FACE

## 2025-08-11 ASSESSMENT — LOCATION DETAILED DESCRIPTION DERM: LOCATION DETAILED: LEFT CHIN
